# Patient Record
Sex: FEMALE | Race: BLACK OR AFRICAN AMERICAN | NOT HISPANIC OR LATINO | URBAN - METROPOLITAN AREA
[De-identification: names, ages, dates, MRNs, and addresses within clinical notes are randomized per-mention and may not be internally consistent; named-entity substitution may affect disease eponyms.]

---

## 2020-11-07 ENCOUNTER — EMERGENCY (EMERGENCY)
Facility: HOSPITAL | Age: 85
LOS: 1 days | Discharge: HOME | End: 2020-11-07
Attending: EMERGENCY MEDICINE | Admitting: EMERGENCY MEDICINE
Payer: MEDICARE

## 2020-11-07 VITALS
DIASTOLIC BLOOD PRESSURE: 68 MMHG | HEART RATE: 74 BPM | OXYGEN SATURATION: 100 % | RESPIRATION RATE: 18 BRPM | TEMPERATURE: 98 F | SYSTOLIC BLOOD PRESSURE: 157 MMHG

## 2020-11-07 VITALS
DIASTOLIC BLOOD PRESSURE: 65 MMHG | RESPIRATION RATE: 20 BRPM | TEMPERATURE: 98 F | SYSTOLIC BLOOD PRESSURE: 158 MMHG | HEART RATE: 100 BPM | OXYGEN SATURATION: 100 %

## 2020-11-07 LAB
ALBUMIN SERPL ELPH-MCNC: 4.2 G/DL — SIGNIFICANT CHANGE UP (ref 3.3–5)
ALP SERPL-CCNC: 72 U/L — SIGNIFICANT CHANGE UP (ref 40–120)
ALT FLD-CCNC: 14 U/L — SIGNIFICANT CHANGE UP (ref 4–33)
ANION GAP SERPL CALC-SCNC: 16 MMO/L — HIGH (ref 7–14)
AST SERPL-CCNC: 37 U/L — HIGH (ref 4–32)
BASOPHILS # BLD AUTO: 0.03 K/UL — SIGNIFICANT CHANGE UP (ref 0–0.2)
BASOPHILS NFR BLD AUTO: 0.5 % — SIGNIFICANT CHANGE UP (ref 0–2)
BILIRUB SERPL-MCNC: 0.4 MG/DL — SIGNIFICANT CHANGE UP (ref 0.2–1.2)
BUN SERPL-MCNC: 13 MG/DL — SIGNIFICANT CHANGE UP (ref 7–23)
CALCIUM SERPL-MCNC: 9.3 MG/DL — SIGNIFICANT CHANGE UP (ref 8.4–10.5)
CHLORIDE SERPL-SCNC: 103 MMOL/L — SIGNIFICANT CHANGE UP (ref 98–107)
CO2 SERPL-SCNC: 17 MMOL/L — LOW (ref 22–31)
CREAT SERPL-MCNC: 0.7 MG/DL — SIGNIFICANT CHANGE UP (ref 0.5–1.3)
EOSINOPHIL # BLD AUTO: 0.02 K/UL — SIGNIFICANT CHANGE UP (ref 0–0.5)
EOSINOPHIL NFR BLD AUTO: 0.3 % — SIGNIFICANT CHANGE UP (ref 0–6)
GLUCOSE SERPL-MCNC: 98 MG/DL — SIGNIFICANT CHANGE UP (ref 70–99)
HCT VFR BLD CALC: 42.4 % — SIGNIFICANT CHANGE UP (ref 34.5–45)
HGB BLD-MCNC: 13.1 G/DL — SIGNIFICANT CHANGE UP (ref 11.5–15.5)
IMM GRANULOCYTES NFR BLD AUTO: 0.3 % — SIGNIFICANT CHANGE UP (ref 0–1.5)
LYMPHOCYTES # BLD AUTO: 1.25 K/UL — SIGNIFICANT CHANGE UP (ref 1–3.3)
LYMPHOCYTES # BLD AUTO: 19.6 % — SIGNIFICANT CHANGE UP (ref 13–44)
MAGNESIUM SERPL-MCNC: 2.3 MG/DL — SIGNIFICANT CHANGE UP (ref 1.6–2.6)
MCHC RBC-ENTMCNC: 27.1 PG — SIGNIFICANT CHANGE UP (ref 27–34)
MCHC RBC-ENTMCNC: 30.9 % — LOW (ref 32–36)
MCV RBC AUTO: 87.8 FL — SIGNIFICANT CHANGE UP (ref 80–100)
MONOCYTES # BLD AUTO: 0.42 K/UL — SIGNIFICANT CHANGE UP (ref 0–0.9)
MONOCYTES NFR BLD AUTO: 6.6 % — SIGNIFICANT CHANGE UP (ref 2–14)
NEUTROPHILS # BLD AUTO: 4.65 K/UL — SIGNIFICANT CHANGE UP (ref 1.8–7.4)
NEUTROPHILS NFR BLD AUTO: 72.7 % — SIGNIFICANT CHANGE UP (ref 43–77)
NRBC # FLD: 0 K/UL — SIGNIFICANT CHANGE UP (ref 0–0)
PHOSPHATE SERPL-MCNC: 3.6 MG/DL — SIGNIFICANT CHANGE UP (ref 2.5–4.5)
PLATELET # BLD AUTO: 148 K/UL — LOW (ref 150–400)
PMV BLD: 12.3 FL — SIGNIFICANT CHANGE UP (ref 7–13)
POTASSIUM SERPL-MCNC: 5.1 MMOL/L — SIGNIFICANT CHANGE UP (ref 3.5–5.3)
POTASSIUM SERPL-SCNC: 5.1 MMOL/L — SIGNIFICANT CHANGE UP (ref 3.5–5.3)
PROT SERPL-MCNC: 7.9 G/DL — SIGNIFICANT CHANGE UP (ref 6–8.3)
RBC # BLD: 4.83 M/UL — SIGNIFICANT CHANGE UP (ref 3.8–5.2)
RBC # FLD: 13.6 % — SIGNIFICANT CHANGE UP (ref 10.3–14.5)
SODIUM SERPL-SCNC: 136 MMOL/L — SIGNIFICANT CHANGE UP (ref 135–145)
TROPONIN T, HIGH SENSITIVITY: 7 NG/L — SIGNIFICANT CHANGE UP (ref ?–14)
TROPONIN T, HIGH SENSITIVITY: 9 NG/L — SIGNIFICANT CHANGE UP (ref ?–14)
WBC # BLD: 6.39 K/UL — SIGNIFICANT CHANGE UP (ref 3.8–10.5)
WBC # FLD AUTO: 6.39 K/UL — SIGNIFICANT CHANGE UP (ref 3.8–10.5)

## 2020-11-07 PROCEDURE — 99284 EMERGENCY DEPT VISIT MOD MDM: CPT

## 2020-11-07 PROCEDURE — 71046 X-RAY EXAM CHEST 2 VIEWS: CPT | Mod: 26

## 2020-11-07 NOTE — ED ADULT TRIAGE NOTE - CHIEF COMPLAINT QUOTE
carito oliveros [phone].  c/o palpitations since yesterday with  ch pressure. reports pedal edema,placed on lasix wed with improvement. reports feels shortness breath x 2 days

## 2020-11-07 NOTE — ED PROVIDER NOTE - CLINICAL SUMMARY MEDICAL DECISION MAKING FREE TEXT BOX
Ms. Franco is a 85 with HTN who present for chest pain and SOB after self discontinuation of Lasix for 3 days. Will obtain BNP, CXR to assess for CHF. Will obtain basic labs (cbc, cmp, trop T)

## 2020-11-07 NOTE — ED PROVIDER NOTE - NS ED ROS FT
REVIEW OF SYSTEMS:  CONSTITUTIONAL: No fever, weight loss, or fatigue  EYES: No eye pain, visual disturbances, or discharge  RESPIRATORY: No cough, wheezing, chills or hemoptysis; No shortness of breath  CARDIOVASCULAR: + chest pain, + palpitations, dizziness, or leg swelling  GASTROINTESTINAL: No abdominal or epigastric pain. No nausea, vomiting, or hematemesis; No diarrhea or constipation. No melena or hematochezia.  GENITOURINARY: No dysuria, frequency, hematuria, or incontinence  NEUROLOGICAL: No headaches, memory loss, loss of strength, numbness, or tremors  MUSCULOSKELETAL: No joint pain or swelling; No muscle, back, or extremity pain

## 2020-11-07 NOTE — ED ADULT NURSE NOTE - NSSEPSISNEWALTERMENTAL_ED_A_ED
Telephone Encounter by Shasha Jasso at 10/23/18 12:52 PM     Author:  Shasha Jasso Service:  (none) Author Type:  Patient      Filed:  10/23/18 12:55 PM Encounter Date:  10/22/2018 Status:  Signed     :  Shasha Jasso (Patient )            Patient notified.[JP1.1T] [employer] message sent[JP1.1M]      Revision History        User Key Date/Time User Provider Type Action    > JP1.1 10/23/18 12:55 PM Shasha Jasso Patient  Sign    M - Manual, T - Template             No

## 2020-11-07 NOTE — ED PROVIDER NOTE - PATIENT PORTAL LINK FT
You can access the FollowMyHealth Patient Portal offered by [facility] by registering at the following website: [email or web address]. By joining [facility]’s FollowMyHealth portal, you will also be able to view your health information using other applications (apps) compatible with our system.

## 2020-11-07 NOTE — ED PROVIDER NOTE - NSFOLLOWUPINSTRUCTIONS_ED_ALL_ED_FT
Please be sure to take all your medications as prescribed. Please continue to take your hydralazine, amlodipine and furosemide as prescribed by your cardiologist.

## 2020-11-07 NOTE — ED ADULT NURSE NOTE - CHIEF COMPLAINT QUOTE
carito oliveros [phone].  c/o palpitations since yesterday with  ch pressure. reports pedal edema,placed on lasix wed with improvement. reports feels shortness breath x 2 days

## 2020-11-07 NOTE — ED ADULT NURSE NOTE - OBJECTIVE STATEMENT
Break coverage RN- Received pt in room 15. pt A&OX3, ambulatory. pt with history of HTN, on medication. Pt c/o intermittent chest pressure, palpitations, and sob since yesterday. pt also reports pedal edema placed on Lasix wed with improvement. pt appears stable and in no apparent distress at this time. vitals as noted. respirations are equal and nonlabored, no respiratory distress noted, sating 100% on room air. Pt placed on cardiac monitor. Pt denies any other medical complaints at this time. denies pain at this time, sob, cough, fever/chills, headache, dizziness, nausea, vomiting, diarrhea. 22 gauge iv placed in the left hand, labs sent. pt stable, awaiting further plan

## 2020-11-07 NOTE — ED PROVIDER NOTE - ATTENDING CONTRIBUTION TO CARE
Well appearing elderly female with resolved chest pain, currently asymptomatic. EKG WNL, lungs clear, no LE edema. Pending trops, cxr, pt already has f/u this week w her cardiologist.

## 2020-11-07 NOTE — ED PROVIDER NOTE - PHYSICAL EXAMINATION
PHYSICAL EXAM:  General: Alert and cooperative. No acute stress. Well developed, well nourished.   Eyes: Intact visual fields. PERRL, clear conjunctiva  Throat: Oral cavity and pharynx normal.   Respiratory: Mild crackles noted at the bases, no wheezing appreciated, no rhonchi.   Cardiovascular: S1/S2 auscultated. Regular rhythm. No murmurs, rubs or gallop. No peripheral edema, cyanosis, or pallor. Extremities warm and well perfused.   Abdomen: Soft, non-tender, nondistended, no guarding or rebound tenderness. Active bowel sounds in all 4 quadrants. No hepatosplenomegaly.  Musculoskeletal: Adequately aligned spine. ROM intact. No joint erythema or tenderness. Normal muscular development. Normal gait.   Extremities: No significant deformity or joint abnormality. Peripheral pulses intact. No varicosities. No peripheral edema, atrophy.

## 2020-11-07 NOTE — ED PROVIDER NOTE - OBJECTIVE STATEMENT
Ms. Franco is a 85 y.o F with HTN who presents for chest pain pressure for 2 days. She states that she visited her cardiologist Dr. Hays and was increased from hydral 50 qd to TID due to poorly controlled HTN. She was also recently prescribed lasix 20mg on Tuesday for LE edema. Ms. Franco is a 85 y.o F with HTN who presents for chest pain pressure for 2 days. She states that she visited her cardiologist Dr. Hays and was increased from hydral 50 qd to TID due to poorly controlled HTN. She was also recently prescribed Lasix 20mg on Tuesday for LE edema. Patient states that she only took one dose of Lasix on Wednesday and has not taken any since because her edema was better. She also endorse SOB and palpitations. She states that her BP has been elevated for the last few days. She denies abdominal pain, fevers, chills, headaches. Ms. Franco is a 85 y.o F with HTN who presents for chest pain pressure for 2 days. resolved today. She states that she visited her PCP and was increased from hydral 50 qd to TID due to poorly controlled HTN. She was also recently prescribed Lasix 20mg on Tuesday for LE edema. Patient states that she only took one dose of Lasix on Wednesday and has not taken any since because her edema was better. She also endorse SOB and palpitations. She states that her BP has been elevated for the last few days. She denies abdominal pain, fevers, chills, headaches.

## 2020-12-07 ENCOUNTER — INPATIENT (INPATIENT)
Facility: HOSPITAL | Age: 85
LOS: 0 days | Discharge: HOME | End: 2020-12-08
Attending: INTERNAL MEDICINE | Admitting: INTERNAL MEDICINE
Payer: MEDICARE

## 2020-12-07 VITALS
RESPIRATION RATE: 20 BRPM | TEMPERATURE: 99 F | DIASTOLIC BLOOD PRESSURE: 70 MMHG | SYSTOLIC BLOOD PRESSURE: 154 MMHG | HEART RATE: 106 BPM | OXYGEN SATURATION: 100 %

## 2020-12-07 DIAGNOSIS — R07.9 CHEST PAIN, UNSPECIFIED: ICD-10-CM

## 2020-12-07 PROBLEM — I10 ESSENTIAL (PRIMARY) HYPERTENSION: Chronic | Status: ACTIVE | Noted: 2020-11-07

## 2020-12-07 LAB
ADD ON TEST-SPECIMEN IN LAB: SIGNIFICANT CHANGE UP
ALBUMIN SERPL ELPH-MCNC: 4.9 G/DL — SIGNIFICANT CHANGE UP (ref 3.3–5)
ALP SERPL-CCNC: 91 U/L — SIGNIFICANT CHANGE UP (ref 40–120)
ALT FLD-CCNC: 10 U/L — SIGNIFICANT CHANGE UP (ref 4–33)
ANION GAP SERPL CALC-SCNC: 15 MMOL/L — HIGH (ref 7–14)
APTT BLD: 31 SEC — SIGNIFICANT CHANGE UP (ref 27–36.3)
AST SERPL-CCNC: 19 U/L — SIGNIFICANT CHANGE UP (ref 4–32)
BASOPHILS # BLD AUTO: 0.03 K/UL — SIGNIFICANT CHANGE UP (ref 0–0.2)
BASOPHILS NFR BLD AUTO: 0.4 % — SIGNIFICANT CHANGE UP (ref 0–2)
BILIRUB SERPL-MCNC: 0.5 MG/DL — SIGNIFICANT CHANGE UP (ref 0.2–1.2)
BUN SERPL-MCNC: 16 MG/DL — SIGNIFICANT CHANGE UP (ref 7–23)
CALCIUM SERPL-MCNC: 9.8 MG/DL — SIGNIFICANT CHANGE UP (ref 8.4–10.5)
CHLORIDE SERPL-SCNC: 105 MMOL/L — SIGNIFICANT CHANGE UP (ref 98–107)
CO2 SERPL-SCNC: 20 MMOL/L — LOW (ref 22–31)
CREAT SERPL-MCNC: 0.71 MG/DL — SIGNIFICANT CHANGE UP (ref 0.5–1.3)
D DIMER BLD IA.RAPID-MCNC: 2149 NG/ML DDU — HIGH
EOSINOPHIL # BLD AUTO: 0 K/UL — SIGNIFICANT CHANGE UP (ref 0–0.5)
EOSINOPHIL NFR BLD AUTO: 0 % — SIGNIFICANT CHANGE UP (ref 0–6)
GLUCOSE SERPL-MCNC: 109 MG/DL — HIGH (ref 70–99)
HCT VFR BLD CALC: 43.6 % — SIGNIFICANT CHANGE UP (ref 34.5–45)
HGB BLD-MCNC: 13.6 G/DL — SIGNIFICANT CHANGE UP (ref 11.5–15.5)
IANC: 5.11 K/UL — SIGNIFICANT CHANGE UP (ref 1.5–8.5)
IMM GRANULOCYTES NFR BLD AUTO: 0.3 % — SIGNIFICANT CHANGE UP (ref 0–1.5)
INR BLD: 1.09 RATIO — SIGNIFICANT CHANGE UP (ref 0.88–1.17)
LYMPHOCYTES # BLD AUTO: 1.34 K/UL — SIGNIFICANT CHANGE UP (ref 1–3.3)
LYMPHOCYTES # BLD AUTO: 19.4 % — SIGNIFICANT CHANGE UP (ref 13–44)
MCHC RBC-ENTMCNC: 26.6 PG — LOW (ref 27–34)
MCHC RBC-ENTMCNC: 31.2 GM/DL — LOW (ref 32–36)
MCV RBC AUTO: 85.3 FL — SIGNIFICANT CHANGE UP (ref 80–100)
MONOCYTES # BLD AUTO: 0.41 K/UL — SIGNIFICANT CHANGE UP (ref 0–0.9)
MONOCYTES NFR BLD AUTO: 5.9 % — SIGNIFICANT CHANGE UP (ref 2–14)
NEUTROPHILS # BLD AUTO: 5.11 K/UL — SIGNIFICANT CHANGE UP (ref 1.8–7.4)
NEUTROPHILS NFR BLD AUTO: 74 % — SIGNIFICANT CHANGE UP (ref 43–77)
NRBC # BLD: 0 /100 WBCS — SIGNIFICANT CHANGE UP
NRBC # FLD: 0 K/UL — SIGNIFICANT CHANGE UP
NT-PROBNP SERPL-SCNC: 125 PG/ML — SIGNIFICANT CHANGE UP
PLATELET # BLD AUTO: 217 K/UL — SIGNIFICANT CHANGE UP (ref 150–400)
POTASSIUM SERPL-MCNC: 3.4 MMOL/L — LOW (ref 3.5–5.3)
POTASSIUM SERPL-SCNC: 3.4 MMOL/L — LOW (ref 3.5–5.3)
PROT SERPL-MCNC: 8.6 G/DL — HIGH (ref 6–8.3)
PROTHROM AB SERPL-ACNC: 12.5 SEC — SIGNIFICANT CHANGE UP (ref 9.8–13.1)
RBC # BLD: 5.11 M/UL — SIGNIFICANT CHANGE UP (ref 3.8–5.2)
RBC # FLD: 13.4 % — SIGNIFICANT CHANGE UP (ref 10.3–14.5)
SARS-COV-2 RNA SPEC QL NAA+PROBE: SIGNIFICANT CHANGE UP
SODIUM SERPL-SCNC: 140 MMOL/L — SIGNIFICANT CHANGE UP (ref 135–145)
TROPONIN T, HIGH SENSITIVITY RESULT: 10 NG/L — SIGNIFICANT CHANGE UP
TROPONIN T, HIGH SENSITIVITY RESULT: 10 NG/L — SIGNIFICANT CHANGE UP
WBC # BLD: 6.91 K/UL — SIGNIFICANT CHANGE UP (ref 3.8–10.5)
WBC # FLD AUTO: 6.91 K/UL — SIGNIFICANT CHANGE UP (ref 3.8–10.5)

## 2020-12-07 PROCEDURE — 71275 CT ANGIOGRAPHY CHEST: CPT | Mod: 26

## 2020-12-07 PROCEDURE — 99223 1ST HOSP IP/OBS HIGH 75: CPT

## 2020-12-07 PROCEDURE — 71045 X-RAY EXAM CHEST 1 VIEW: CPT | Mod: 26

## 2020-12-07 PROCEDURE — 99285 EMERGENCY DEPT VISIT HI MDM: CPT

## 2020-12-07 NOTE — ED PROVIDER NOTE - ATTENDING CONTRIBUTION TO CARE
Dr. Pineda:  I performed a face to face bedside interview with patient regarding history of present illness, review of symptoms and past medical history. I completed an independent physical exam.  I have discussed patient's plan of care with PA.   I agree with note as stated above, having amended the EMR as needed to reflect my findings.   This includes HISTORY OF PRESENT ILLNESS, HIV, PAST MEDICAL/SURGICAL/FAMILY/SOCIAL HISTORY, ALLERGIES AND HOME MEDICATIONS, REVIEW OF SYSTEMS, PHYSICAL EXAM, and any PROGRESS NOTES during the time I functioned as the attending physician for this patient.    85F h/o HTN, HLD, presents with 1 week of SOB and right sided chest pain and associated generalized weakness.  Symptoms exacerbated by exertion, improves with rest.  Had echo 1mo ago and reportedly WNL.  ROS otherwise negative.      Exam:  - nad  - mildly tachy  - ctab   -abd soft ntnd  - no pedal edema    A/P  - SOB, CP, eval ACS, PNA  - cbc, cmp, trop, bnp, ekg, CXR

## 2020-12-07 NOTE — ED ADULT NURSE NOTE - OBJECTIVE STATEMENT
pt received in rm 15 AAO x 3. pt reports intermittent right sided chest pain associated with SOB on exertion x 2 weeks. pt denies n/v/d, fevers, chills, cough, recent travel, sick contacts. respirations even and unlabored. no edema or redness noted to b/l LE. 20g iv placed to left ac. sinus rhythm on monitor.

## 2020-12-07 NOTE — ED PROVIDER NOTE - OBJECTIVE STATEMENT
86 y/o F hx HTN here c/o right sided chest pain x 2 weeks, intermittent, constant x 3 days. Associated w. shortness of breath on exertion and with laying down flat. Went to see her cardiologist several weeks ago, who performed an echo which was wnl. Pt states she also had swelling in her legs, had an US done, presumably no clots, but was started on Simvastatin. Last treadmill stress test was done earlier in the year, no nuclear stress was performed. Denies fevers, chills, cough, smoking hx, calf swelling/tenderness, recent travel or any other complaints.

## 2020-12-07 NOTE — ED PROVIDER NOTE - CLINICAL SUMMARY MEDICAL DECISION MAKING FREE TEXT BOX
86 y/o F hx HTN here c/o right sided chest pain x 2 weeks, intermittent, constant x 3 days. Heart score 4. Plan labs/trop, cxr, admit for acs work up

## 2020-12-07 NOTE — ED ADULT NURSE NOTE - CHIEF COMPLAINT QUOTE
Pt c/o intermittent non-radiating, right sided chest pain since Saturday. Reports today the chest pain is consistent and is now experiencing consistent shortness of breath. Additionally reports noticing bruising on her left thigh. Denies recent falls. Takes aspirin. Hx of HTN

## 2020-12-07 NOTE — ED ADULT TRIAGE NOTE - CHIEF COMPLAINT QUOTE
Pt c/o intermittent non-radiating, right sided chest pain since Saturday. Reports today the chest pain was consistent and is now experiencing consistent shortness of breath. Additionally reports noticing bruising on her left thigh. Denies recent falls. Takes aspirin. Hx of HTN Pt c/o intermittent non-radiating, right sided chest pain since Saturday. Reports today the chest pain is consistent and is now experiencing consistent shortness of breath. Additionally reports noticing bruising on her left thigh. Denies recent falls. Takes aspirin. Hx of HTN

## 2020-12-08 VITALS
SYSTOLIC BLOOD PRESSURE: 120 MMHG | OXYGEN SATURATION: 99 % | DIASTOLIC BLOOD PRESSURE: 72 MMHG | HEART RATE: 72 BPM | RESPIRATION RATE: 18 BRPM

## 2020-12-08 DIAGNOSIS — Z29.9 ENCOUNTER FOR PROPHYLACTIC MEASURES, UNSPECIFIED: ICD-10-CM

## 2020-12-08 DIAGNOSIS — R60.0 LOCALIZED EDEMA: ICD-10-CM

## 2020-12-08 DIAGNOSIS — I10 ESSENTIAL (PRIMARY) HYPERTENSION: ICD-10-CM

## 2020-12-08 DIAGNOSIS — R07.9 CHEST PAIN, UNSPECIFIED: ICD-10-CM

## 2020-12-08 DIAGNOSIS — Z90.49 ACQUIRED ABSENCE OF OTHER SPECIFIED PARTS OF DIGESTIVE TRACT: Chronic | ICD-10-CM

## 2020-12-08 LAB
ANION GAP SERPL CALC-SCNC: 13 MMOL/L — SIGNIFICANT CHANGE UP (ref 7–14)
BUN SERPL-MCNC: 13 MG/DL — SIGNIFICANT CHANGE UP (ref 7–23)
CALCIUM SERPL-MCNC: 9.9 MG/DL — SIGNIFICANT CHANGE UP (ref 8.4–10.5)
CHLORIDE SERPL-SCNC: 106 MMOL/L — SIGNIFICANT CHANGE UP (ref 98–107)
CO2 SERPL-SCNC: 22 MMOL/L — SIGNIFICANT CHANGE UP (ref 22–31)
CREAT SERPL-MCNC: 0.63 MG/DL — SIGNIFICANT CHANGE UP (ref 0.5–1.3)
GLUCOSE SERPL-MCNC: 121 MG/DL — HIGH (ref 70–99)
POTASSIUM SERPL-MCNC: 3.9 MMOL/L — SIGNIFICANT CHANGE UP (ref 3.5–5.3)
POTASSIUM SERPL-SCNC: 3.9 MMOL/L — SIGNIFICANT CHANGE UP (ref 3.5–5.3)
SODIUM SERPL-SCNC: 141 MMOL/L — SIGNIFICANT CHANGE UP (ref 135–145)

## 2020-12-08 PROCEDURE — 93018 CV STRESS TEST I&R ONLY: CPT | Mod: GC

## 2020-12-08 PROCEDURE — 99236 HOSP IP/OBS SAME DATE HI 85: CPT

## 2020-12-08 PROCEDURE — 93016 CV STRESS TEST SUPVJ ONLY: CPT | Mod: GC

## 2020-12-08 PROCEDURE — 78452 HT MUSCLE IMAGE SPECT MULT: CPT | Mod: 26

## 2020-12-08 RX ORDER — ENOXAPARIN SODIUM 100 MG/ML
40 INJECTION SUBCUTANEOUS DAILY
Refills: 0 | Status: DISCONTINUED | OUTPATIENT
Start: 2020-12-08 | End: 2020-12-08

## 2020-12-08 RX ORDER — HYDRALAZINE HCL 50 MG
1 TABLET ORAL
Qty: 0 | Refills: 0 | DISCHARGE

## 2020-12-08 RX ORDER — ASPIRIN/CALCIUM CARB/MAGNESIUM 324 MG
1 TABLET ORAL
Qty: 0 | Refills: 0 | DISCHARGE
Start: 2020-12-08

## 2020-12-08 RX ORDER — FUROSEMIDE 40 MG
1 TABLET ORAL
Qty: 0 | Refills: 0 | DISCHARGE

## 2020-12-08 RX ORDER — AMLODIPINE BESYLATE 2.5 MG/1
10 TABLET ORAL DAILY
Refills: 0 | Status: DISCONTINUED | OUTPATIENT
Start: 2020-12-08 | End: 2020-12-08

## 2020-12-08 RX ORDER — AMLODIPINE BESYLATE 2.5 MG/1
1 TABLET ORAL
Qty: 0 | Refills: 0 | DISCHARGE

## 2020-12-08 RX ORDER — ATORVASTATIN CALCIUM 80 MG/1
10 TABLET, FILM COATED ORAL AT BEDTIME
Refills: 0 | Status: DISCONTINUED | OUTPATIENT
Start: 2020-12-08 | End: 2020-12-08

## 2020-12-08 RX ORDER — POTASSIUM CHLORIDE 20 MEQ
40 PACKET (EA) ORAL ONCE
Refills: 0 | Status: COMPLETED | OUTPATIENT
Start: 2020-12-08 | End: 2020-12-08

## 2020-12-08 RX ORDER — HYDRALAZINE HCL 50 MG
50 TABLET ORAL
Refills: 0 | Status: DISCONTINUED | OUTPATIENT
Start: 2020-12-08 | End: 2020-12-08

## 2020-12-08 RX ORDER — ACETAMINOPHEN 500 MG
650 TABLET ORAL EVERY 6 HOURS
Refills: 0 | Status: DISCONTINUED | OUTPATIENT
Start: 2020-12-08 | End: 2020-12-08

## 2020-12-08 RX ORDER — ASPIRIN/CALCIUM CARB/MAGNESIUM 324 MG
81 TABLET ORAL DAILY
Refills: 0 | Status: DISCONTINUED | OUTPATIENT
Start: 2020-12-08 | End: 2020-12-08

## 2020-12-08 RX ORDER — FUROSEMIDE 40 MG
20 TABLET ORAL DAILY
Refills: 0 | Status: DISCONTINUED | OUTPATIENT
Start: 2020-12-08 | End: 2020-12-08

## 2020-12-08 RX ADMIN — ENOXAPARIN SODIUM 40 MILLIGRAM(S): 100 INJECTION SUBCUTANEOUS at 11:58

## 2020-12-08 RX ADMIN — Medication 50 MILLIGRAM(S): at 00:23

## 2020-12-08 RX ADMIN — Medication 50 MILLIGRAM(S): at 18:37

## 2020-12-08 RX ADMIN — Medication 81 MILLIGRAM(S): at 11:58

## 2020-12-08 RX ADMIN — AMLODIPINE BESYLATE 10 MILLIGRAM(S): 2.5 TABLET ORAL at 05:16

## 2020-12-08 RX ADMIN — Medication 50 MILLIGRAM(S): at 05:16

## 2020-12-08 RX ADMIN — Medication 40 MILLIEQUIVALENT(S): at 00:39

## 2020-12-08 NOTE — H&P ADULT - HISTORY OF PRESENT ILLNESS
84 y/o F hx HTN, LE edema on lasix p/w c/o right sided chest pain x 1 week. Pt reports it was initially sharp but now pressure like pain, intermittent without radiation to the arm/jaw. States she had no pain on arrival but now feels some pressure like pain behind the right breast/under the axilla that radiates towards the RUQ. Pain not associated with exertion, alleviated by Tylenol. No prior history of similar pain. Admits to lifting heavy groceries about a week ago. No associated diaphoresis, N/V however pt has noticed associated shortness of breath on exertion that preceded chest pain, states onset of SOB and fatigue after walking 2 blocks.  Went to see her cardiologist several weeks ago, who performed an echo which was wnl. She was started on lasix for LE swelling which was since resolved. Pt also had dopplers of her legs performed, presumably no clots, but was started on Simvastatin for unclear reason. Additional BP agents were recenrlt added on due to uncontrolled blood pressure. Pt reports last treadmill stress test was done earlier in the year and was normal. Denies fevers, chills, cough, calf swelling/tenderness, recent travel. 86 y/o F hx HTN, LE edema on lasix p/w c/o right sided chest pain x 1 week. Pt reports it was initially sharp but now pressure like pain, intermittent without radiation to the arm/jaw. States she had no pain on arrival but now feels some pressure like pain behind the right breast/under the axilla that radiates towards the RUQ. Pain not associated with exertion, alleviated by Tylenol. No prior history of similar pain. Admits to lifting heavy groceries about a week ago. No associated diaphoresis, N/V however pt has noticed associated shortness of breath on exertion that preceded chest pain, states onset of SOB and fatigue after walking 2 blocks.  Went to see her cardiologist several weeks ago, who performed an echo which was wnl. She was started on lasix for LE swelling which was since resolved. Pt also had dopplers of her legs performed, presumably no clots, but was started on Simvastatin for unclear reason. Additional BP agents were recently added on due to uncontrolled blood pressure. Pt reports last treadmill stress test was done earlier in the year and was normal. Denies fevers, chills, cough, calf swelling/tenderness, recent travel.

## 2020-12-08 NOTE — DISCHARGE NOTE PROVIDER - NSDCCPCAREPLAN_GEN_ALL_CORE_FT
PRINCIPAL DISCHARGE DIAGNOSIS  Diagnosis: Chest pain  Assessment and Plan of Treatment: Follow up with your cardiologist in one week  low salt, low fat, low cholesterol  Continue aspirin      SECONDARY DISCHARGE DIAGNOSES  Diagnosis: HTN (hypertension)  Assessment and Plan of Treatment: HTN (hypertension)  Continue lasix, hydralazine, norvasc

## 2020-12-08 NOTE — H&P ADULT - NSHPLABSRESULTS_GEN_ALL_CORE
(12-07 @ 16:12)                      13.6  6.91 )-----------( 217                 43.6    Neutrophils = 5.11 (74.0%)  Lymphocytes = 1.34 (19.4%)  Eosinophils = 0.00 (0.0%)  Basophils = 0.03 (0.4%)  Monocytes = 0.41 (5.9%)  Bands = --%    12-07    140  |  105  |  16  ----------------------------<  109<H>  3.4<L>   |  20<L>  |  0.71    Ca    9.8      07 Dec 2020 16:16    TPro  8.6<H>  /  Alb  4.9  /  TBili  0.5  /  DBili  x   /  AST  19  /  ALT  10  /  AlkPhos  91  12-07    ( 07 Dec 2020 16:14 )   PT: 12.5 sec;   INR: 1.09 ratio;       PTT:31.0 sec    < from: CT Angio Chest w/ IV Cont (12.07.20 @ 18:43) >      IMPRESSION:    No pulmonary embolus.      < end of copied text >      Labs and imaging reviewed  EKG: NSR, some artifact in leads v4-v6 however no acute ischemic changes

## 2020-12-08 NOTE — H&P ADULT - PROBLEM SELECTOR PLAN 3
-none at present  -recent dopplers neg for DVT  -reportedly started on lasix? Will hold until can clarify especially in the setting of recent contrast load so as to prevent any kidney disfunction -none at present  -recent dopplers neg for DVT  -reportedly started on lasix? Will hold until can clarify/obtain TTE results especially in the setting of recent contrast load so as to prevent any kidney disfunction

## 2020-12-08 NOTE — H&P ADULT - PROBLEM SELECTOR PLAN 2
-uncontrolled however without signs of end organ damage   -c/w hydralazine, amlodipine  -titrate as needed

## 2020-12-08 NOTE — CONSULT NOTE ADULT - SUBJECTIVE AND OBJECTIVE BOX
Cardiology/Vascular Medicine Inpatient Consultation Note    HISTORY OF PRESENT ILLNESS:  Patient is an 86 y/o F hx HTN, LE edema on lasix p/w c/o right sided chest pain x 1 week. Pt reports it was initially sharp but now pressure like pain, intermittent without radiation to the arm/jaw. States she had no pain on arrival but now feels some pressure like pain behind the right breast/under the axilla that radiates towards the RUQ. Pain not associated with exertion, alleviated by Tylenol. No prior history of similar pain. Admits to lifting heavy groceries about a week ago. No associated diaphoresis, N/V however pt has noticed associated shortness of breath on exertion that preceded chest pain, states onset of SOB and fatigue after walking 2 blocks.  Went to see her cardiologist several weeks ago, who performed an echo which was wnl. She was started on lasix for LE swelling which was since resolved. Pt also had dopplers of her legs performed, presumably no clots, but was started on Simvastatin for unclear reason. Additional BP agents were recently added on due to uncontrolled blood pressure. Pt reports last treadmill stress test was done earlier in the year and was normal. Denies fevers, chills, cough, calf swelling/tenderness, recent travel. (08 Dec 2020 00:12)    Allergies  No Known Allergies    MEDICATIONS:  amLODIPine   Tablet 10 milliGRAM(s) Oral daily  aspirin enteric coated 81 milliGRAM(s) Oral daily  enoxaparin Injectable 40 milliGRAM(s) SubCutaneous daily  hydrALAZINE 50 milliGRAM(s) Oral two times a day  acetaminophen   Tablet .. 650 milliGRAM(s) Oral every 6 hours PRN  atorvastatin 10 milliGRAM(s) Oral at bedtime    PAST MEDICAL & SURGICAL HISTORY:  HTN (hypertension)  Status post cholecystectomy    FAMILY HISTORY:  FH: heart disease    SOCIAL HISTORY:    As above, as per chart notes    REVIEW OF SYSTEMS:  As above, as per chart notes    PHYSICAL EXAM:  T(C): 36.8 (12-08-20 @ 05:15), Max: 37.2 (12-07-20 @ 12:18)  HR: 80 (12-08-20 @ 05:15) (73 - 106)  BP: 133/73 (12-08-20 @ 05:15) (133/73 - 182/73)  RR: 18 (12-08-20 @ 05:15) (15 - 20)  SpO2: 100% (12-08-20 @ 05:15) (100% - 100%)    Appearance: NAD  HEENT:   No JVD  Cardiovascular: Normal S1 S2, No JVD, No murmurs, No edema  Respiratory: Decreased breath sounds bilateral bases  Psychiatry: Awake, alert  Gastrointestinal:  Soft, Non-tender, + BS	  Extremities: No edema    LABS:	 	                          13.6   6.91  )-----------( 217      ( 07 Dec 2020 16:12 )             43.6     12-07    140  |  105  |  16  ----------------------------<  109<H>  3.4<L>   |  20<L>  |  0.71    Ca    9.8      07 Dec 2020 16:16    TPro  8.6<H>  /  Alb  4.9  /  TBili  0.5  /  DBili  x   /  AST  19  /  ALT  10  /  AlkPhos  91  12-07      proBNP: Serum Pro-Brain Natriuretic Peptide: 125 pg/mL (12-07-20 @ 16:16)      d< from: CT Angio Chest w/ IV Cont (12.07.20 @ 18:43) >    EXAM:  CT ANGIO CHEST (W)AW IC        PROCEDURE DATE:  Dec  7 2020         INTERPRETATION:  INDICATION, CLINICAL INFORMATION: Shortness of breath    TECHNIQUE: CT pulmonary angiogram of the chest was performed. Coronal and sagittal images were reconstructed. Maximum intensity projection images were generated. Images were obtained after the uneventful administration of 90 cc nonionic intravenous Omnipaque 350. 10 cc of Omnipaque 350 was discarded.      COMPARISON: None.    FINDINGS:    PULMONARYVESSELS: No pulmonary embolus. Main pulmonary artery normal in diameter.    HEART AND VASCULATURE: Cardiomegaly. No pericardial effusion. No aortic aneurysm or dissection. Coronary calcifications.    LUNGS, AIRWAYS, PLEURA: Patent central airways. Clear lungs. No pleural effusions or pneumothorax.    MEDIASTINUM: No mass or lymphadenopathy.    UPPER ABDOMEN: Within normal limits.    BONES AND SOFT TISSUES: No aggressive osseous lesion.    LOWER NECK: Small thyroid nodules.    IMPRESSION:    No pulmonary embolus.      VALENTINA SHEFFIELD MD; Attending Radiologist  This document has been electronically signed. Dec  7 2020  6:55PM    < end of copied text >  < from: Xray Chest 1 View- PORTABLE-Urgent (Xray Chest 1 View- PORTABLE-Urgent .) (12.07.20 @ 14:58) >    EXAM:  XR CHEST PORTABLE URGENT 1V        PROCEDURE DATE:  Dec  7 2020         INTERPRETATION:  CLINICAL INDICATION: chest pain; dyspnea    EXAM:  Portable upright frontal chest from 12/7/2020 at 1458. Compared to prior study from 11/7/2020.    IMPRESSION:  Clear lungs. No pleural effusions or pneumothorax.    Stable cardiac and mediastinal silhouettes including aortic arch calcifications.    Trachea midline.    Generalized osteopenia and spinal degenerative changes with slight curvature again noted. Left shoulder degenerative change.    Also correlate with findings on already pending chest CTA.      JACKIE VAUGHAN MD; Attending Radiologist  This document has been electronically signed. Dec  7 2020  4:00PM    < end of copied text >   Cardiology/Vascular Medicine Inpatient Consultation Note    HISTORY OF PRESENT ILLNESS:  Patient is an 86 y/o F hx HTN, LE edema on Lasix p/w c/o right sided chest pain x 1 week.     Pt reports it was initially sharp but now pressure like pain, intermittent without radiation to the arm/jaw. States she had no pain on arrival but now feels some pressure like pain behind the right breast/under the axilla that radiates towards the RUQ. Pain not associated with exertion, alleviated by Tylenol. No prior history of similar pain. Admits to lifting heavy groceries about a week ago. No associated diaphoresis, N/V however pt has noticed associated shortness of breath on exertion that preceded chest pain, states onset of SOB and fatigue after walking 2 blocks.  Went to see her cardiologist several weeks ago, who performed an echocardiogram which was reportedly "normal" as per patient.     She was started on Lasix for LE swelling which was since resolved. LE venous duplex US was performed also, presumably no clots, but was started on Simvastatin for unclear reason.  Additional BP agents were recently added on due to uncontrolled blood pressure. Pt reports last treadmill stress test was done earlier in the year and was normal. Denies fevers, chills, cough, calf swelling/tenderness, recent travel. (08 Dec 2020 00:12)    At the time of my evaluation, the patient appeared clinically and hemodynamically stable. No interval events.  CTA chest unremarkable for PE.  NST unremarkable for ischemia.    Patient may be discharged home today with advice to f/u PMD.    Allergies  No Known Allergies    MEDICATIONS:  amLODIPine   Tablet 10 milliGRAM(s) Oral daily  aspirin enteric coated 81 milliGRAM(s) Oral daily  enoxaparin Injectable 40 milliGRAM(s) SubCutaneous daily  hydrALAZINE 50 milliGRAM(s) Oral two times a day  acetaminophen   Tablet .. 650 milliGRAM(s) Oral every 6 hours PRN  atorvastatin 10 milliGRAM(s) Oral at bedtime    PAST MEDICAL & SURGICAL HISTORY:  HTN (hypertension)  Status post cholecystectomy    FAMILY HISTORY:  FH: heart disease    SOCIAL HISTORY:    As above, as per chart notes    REVIEW OF SYSTEMS:  As above, as per chart notes    PHYSICAL EXAM:  T(C): 36.8 (20 @ 05:15), Max: 37.2 (20 @ 12:18)  HR: 80 (20 @ 05:15) (73 - 106)  BP: 133/73 (20 @ 05:15) (133/73 - 182/73)  RR: 18 (20 @ 05:15) (15 - 20)  SpO2: 100% (20 @ 05:15) (100% - 100%)    Appearance: NAD  HEENT:   No JVD  Cardiovascular: Normal S1 S2, No JVD, No murmurs, No edema  Respiratory: Decreased breath sounds bilateral bases  Psychiatry: Awake, alert  Gastrointestinal:  Soft, Non-tender, + BS	  Extremities: No edema    LABS:	 	                          13.6   6.91  )-----------( 217      ( 07 Dec 2020 16:12 )             43.6         140  |  105  |  16  ----------------------------<  109<H>  3.4<L>   |  20<L>  |  0.71    Ca    9.8      07 Dec 2020 16:16    TPro  8.6<H>  /  Alb  4.9  /  TBili  0.5  /  DBili  x   /  AST  19  /  ALT  10  /  AlkPhos  91  12-      proBNP: Serum Pro-Brain Natriuretic Peptide: 125 pg/mL (20 @ 16:16)      d< from: CT Angio Chest w/ IV Cont (20 @ 18:43) >    EXAM:  CT ANGIO CHEST (W)AW IC        PROCEDURE DATE:  Dec  7 2020         INTERPRETATION:  INDICATION, CLINICAL INFORMATION: Shortness of breath    TECHNIQUE: CT pulmonary angiogram of the chest was performed. Coronal and sagittal images were reconstructed. Maximum intensity projection images were generated. Images were obtained after the uneventful administration of 90 cc nonionic intravenous Omnipaque 350. 10 cc of Omnipaque 350 was discarded.      COMPARISON: None.    FINDINGS:    PULMONARYVESSELS: No pulmonary embolus. Main pulmonary artery normal in diameter.    HEART AND VASCULATURE: Cardiomegaly. No pericardial effusion. No aortic aneurysm or dissection. Coronary calcifications.    LUNGS, AIRWAYS, PLEURA: Patent central airways. Clear lungs. No pleural effusions or pneumothorax.    MEDIASTINUM: No mass or lymphadenopathy.    UPPER ABDOMEN: Within normal limits.    BONES AND SOFT TISSUES: No aggressive osseous lesion.    LOWER NECK: Small thyroid nodules.    IMPRESSION:    No pulmonary embolus.      VALENTINA SHEFFIELD MD; Attending Radiologist  This document has been electronically signed. Dec  7 2020  6:55PM    < end of copied text >  < from: Xray Chest 1 View- PORTABLE-Urgent (Xray Chest 1 View- PORTABLE-Urgent .) (20 @ 14:58) >    EXAM:  XR CHEST PORTABLE URGENT 1V        PROCEDURE DATE:  Dec  7 2020         INTERPRETATION:  CLINICAL INDICATION: chest pain; dyspnea    EXAM:  Portable upright frontal chest from 2020 at 1458. Compared to prior study from 2020.    IMPRESSION:  Clear lungs. No pleural effusions or pneumothorax.    Stable cardiac and mediastinal silhouettes including aortic arch calcifications.    Trachea midline.    Generalized osteopenia and spinal degenerative changes with slight curvature again noted. Left shoulder degenerative change.    Also correlate with findings on already pending chest CTA.      JACKIE VAUGHAN MD; Attending Radiologist  This document has been electronically signed. Dec  7 2020  4:00PM    < end of copied text >    nu< from: Nuclear Stress Test-Pharmacologic (Nuclear Stress Test-Pharmacologic .) (20 @ 09:23) >    PATIENT: NIKUNJ HERNANDEZ  : 1935   AGE: 85 (F)   MR#: [id number]  STUDY DATE: 2020  LOCATION: [facility]  REF. PHYSICIAN(S): Viral Saucedo MD  FELLOW: Kala Jaimes M.D.  ------------------------------------------------------------------------  TYPE OF TEST: Stress Pharmacologic  INDICATION: Chest pain, unspecified (R07.9)  ------------------------------------------------------------------------  HISTORY:  CARDIAC HISTORY: 85 year old woman with past medical  history of hypertension arrived to hospital with chest  pain, shortness of breath, and LE edema  RISK FACTORS: Hypertension, Post Menopausal  MEDICATIONS: ASA, Lovenox, Tylenol, Amlodipine, Lipitor,  Lasix, Hydralazine  ------------------------------------------------------------------------  BASELINE ELECTROCARDIOGRAM:  Rhythm: Normal Sinus Rhythm - 95 BPM - 1st Degree Heart  Block  ST: Nonspecific ST-T wave abnormality.  ------------------------------------------------------------------------  HEMODYNAMIC PARAMETERS:                                      HR      BP  Baseline  Pre-Injection             95  147/68  00:00     Inject Regadenoson        93  00:30     Post Injection            92  150/76  01:00     Post Injection           111  152/65  02:00     PostInjection           113  153/66  03:00     Post Injection           105  146/63  04:00     Post Injection           102  144/59  05:00     Recovery                  93  134/57  06:00     Recovery                  92  07:00     Recovery  98  129/58  09:06     Recovery                  88  129/54  ------------------------------------------------------------------------  Agent: Regadenoson 0.4 mg/5 ml NS. injected over 10 sec.  HR: Baseline HR: 95 bpm   Peak HR: 113 bpm (84% of MPHR)  MPHR: 135 bpm   85% of MPHR: 115 bpm  BP: Baseline BP: 147/68 mmHg   Peak BP: 153/66 mmHg   Peak  RPP: [id number] (Rate Pressure Product)  Last Caffeine intake: 12 hrs  Terminated: Completion of protocol  ------------------------------------------------------------------------  SYMPTOMS/FINDINGS:  Symptoms: Shortness of breath  Chest pain: No chest pain with administration of  Regadenoson  Treatment: None  ------------------------------------------------------------------------  ECG ABNORMALITIES DURING/AFTER STRESS:   Abnormalities: None  ------------------------------------------------------------------------  NEW ARRHYTHMIAS DEVELOPED DURING/AFTER STRESS:  Rare VPDs occurred during recovery.  ------------------------------------------------------------------------  STRESS TEST IMPRESSIONS:  Chest Pain: No chest pain with administration of  Regadenoson.  Symptom: Shortness of breath.  HR Response: Appropriate.  BP Response: Appropriate.  Heart Rhythm: Normal Sinus Rhythm - 95 BPM - 1st Degree  Heart Block.  Baseline ECG: Nonspecific ST-T wave abnormality.  ECG Abnormalities: None.  Arrhythmia: Rare VPDs occurred during recovery.  ------------------------------------------------------------------------  PROCEDURE:  7.81 mCi of Tc99m Sestamibi were injected during stress  protocol. Approximately 45 minutes later, tomographic  images were obtained in a 180 degree arc from right  anterior oblique to left anterior oblique with 64 stops.  The tomographic slices were reconstructed in 3 orthogonal  planes (short axis, horizontal long axis and vertical long  axis).  Interpretation was performed both by visual and  quantitative analysis.  Stress images were acquired using CZT-based system with  pinhole collimation (Guruji  c, Sviral),  and reconstructed using MLEM algorithm. Images were  re-acquired with the patient in a prone position.  ------------------------------------------------------------------------  NUCLEAR FINDINGS:  Review of raw data shows: The study is of good technical  quality.  The left ventricle was normal in size. Normal myocardial  perfusion scan,with no evidence of infarction or inducible  ischemia.  ------------------------------------------------------------------------  GATED ANALYSIS:  Post-stress gated wall motion analysis was performed (LVEF  = 67 %;LVEDV = 64 ml.), revealing normal LV function. RV  function appeared normal.  ------------------------------------------------------------------------  IMPRESSIONS:Normal Study  * Myocardial Perfusion SPECT results are normal.  * Review of raw data shows: The study is of good technical  quality.  * The left ventricle was normal in size. Normal myocardial  perfusion scan,with no evidence of infarction or inducible  ischemia.  * Post-stress gated wall motion analysis was performed  (LVEF = 67 %;LVEDV = 64 ml.), revealing normal LV  function. RV function appeared normal.  ------------------------------------------------------------------------  Confirmed on  2020 - 11:37:05 by Gene Medrano M.D.  ------------------------------------------------------------------------    < end of copied text >

## 2020-12-08 NOTE — H&P ADULT - NSHPREVIEWOFSYSTEMS_GEN_ALL_CORE
CONSTITUTIONAL:  No weight loss, fever, chills, weakness or fatigue.  HEENT:  Eyes:  No visual loss, blurred vision, double vision or yellow sclerae. Ears, Nose, Throat:  No hearing loss, sneezing, congestion, runny nose or sore throat.  SKIN:  No rash or itching.  CARDIOVASCULAR:  +right sided chest pain/pressure No palpitations or edema.  RESPIRATORY:  +LEWIS No cough or sputum.  GASTROINTESTINAL:  No anorexia, nausea, vomiting or diarrhea. No abdominal pain or blood.  GENITOURINARY:  Denies hematuria, dysuria.   NEUROLOGICAL:  No headache, dizziness, syncope, paralysis, ataxia, numbness or tingling in the extremities. No change in bowel or bladder control.  MUSCULOSKELETAL:  No muscle, back pain, joint pain or stiffness.  HEMATOLOGIC:  No anemia, bleeding or bruising.  LYMPHATICS:  No enlarged nodes. No history of splenectomy.  PSYCHIATRIC:  No history of depression or anxiety.  ENDOCRINOLOGIC:  No reports of sweating, cold or heat intolerance. No polyuria or polydipsia.  ALLERGIES:  No history of asthma, hives, eczema or rhinitis.

## 2020-12-08 NOTE — H&P ADULT - NSHPPHYSICALEXAM_GEN_ALL_CORE
GENERAL APPEARANCE: Well developed, well nourished, alert and cooperative. NAD.   HEENT:  PERRL, EOMI. External auditory canals normal, hearing grossly intact.  NECK: Neck supple, non-tender without lymphadenopathy, masses or thyromegaly.  CARDIAC: Normal S1 and S2. No S3, S4 or murmurs. Rhythm is regular.  LUNGS: Clear to auscultation and percussion without rales, rhonchi, wheezing or diminished breath sounds.  ABDOMEN: Positive bowel sounds. Soft, nondistended, nontender. No guarding or rebound.   MUSCULOSKELETAL: ROM intact spine and extremities. No joint erythema or tenderness.   BACK: normal posture, no spinal deformity, symmetry of spinal muscles, without tenderness, decreased range of motion.  EXTREMITIES: No significant deformity or joint abnormality. No edema. Peripheral pulses intact.  NEUROLOGICAL: CN II-XII intact. Strength and sensation symmetric and intact throughout.   SKIN: Varicose veins   PSYCHIATRIC: AOx3.Normal affect and behavior.

## 2020-12-08 NOTE — CONSULT NOTE ADULT - ASSESSMENT
84 y/o F hx HTN, LE edema on lasix p/w c/o right sided chest pain admitted for r/o ACS 86 y/o F hx HTN, LE edema on lasix p/w c/o right sided chest pain admitted for r/o ACSPatient is an 86 y/o F hx HTN, LE edema on Lasix p/w c/o right sided chest pain x 1 week.     Pt reports it was initially sharp but now pressure like pain, intermittent without radiation to the arm/jaw. States she had no pain on arrival but now feels some pressure like pain behind the right breast/under the axilla that radiates towards the RUQ. Pain not associated with exertion, alleviated by Tylenol. No prior history of similar pain. Admits to lifting heavy groceries about a week ago. No associated diaphoresis, N/V however pt has noticed associated shortness of breath on exertion that preceded chest pain, states onset of SOB and fatigue after walking 2 blocks.  Went to see her cardiologist several weeks ago, who performed an echocardiogram which was reportedly "normal" as per patient.     She was started on Lasix for LE swelling which was since resolved. LE venous duplex US was performed also, presumably no clots, but was started on Simvastatin for unclear reason.  Additional BP agents were recently added on due to uncontrolled blood pressure. Pt reports last treadmill stress test was done earlier in the year and was normal. Denies fevers, chills, cough, calf swelling/tenderness, recent travel. (08 Dec 2020 00:12)    At the time of my evaluation, the patient appeared clinically and hemodynamically stable. No interval events.  CTA chest unremarkable for PE.  NST unremarkable for ischemia.    Patient may be discharged home today with advice to f/u PMD.

## 2020-12-08 NOTE — DISCHARGE NOTE NURSING/CASE MANAGEMENT/SOCIAL WORK - PATIENT PORTAL LINK FT
You can access the FollowMyHealth Patient Portal offered by [facility] by registering at the following website: [email or web address]. By joining [employer]’s FollowMyHealth portal, you will also be able to view your health information using other applications (apps) compatible with our system.

## 2020-12-08 NOTE — H&P ADULT - NSHPSOCIALHISTORY_GEN_ALL_CORE
Former smoker, quit more than 25 years ago. Currently denies tobacco, alcohol or illicit drug use  Ambulates independently   Lives alone

## 2020-12-08 NOTE — DISCHARGE NOTE PROVIDER - CARE PROVIDER_API CALL
ANA MARIA MEDINA  Internal Medicine  [address] [place] [address]  Phone: [phone]  Fax: [phone]  Follow Up Time:

## 2020-12-08 NOTE — DISCHARGE NOTE PROVIDER - NSDCMRMEDTOKEN_GEN_ALL_CORE_FT
Adult Aspirin Regimen 81 mg oral delayed release tablet: 1 tab(s) orally once a day  amLODIPine 10 mg oral tablet: 1 tab(s) orally once a day  hydrALAZINE 50 mg oral tablet: 1 tab(s) orally 2 times a day  Lasix 20 mg oral tablet: 1 tab(s) orally once a day  pravastatin 40 mg oral tablet: 1 tab(s) orally once a day

## 2020-12-08 NOTE — DISCHARGE NOTE PROVIDER - HOSPITAL COURSE
84 y/o F hx HTN, LE edema on lasix p/w c/o right sided chest pain admitted for Mi rueld out TST negative for ischemia. Patient stable for discharge home FU cardiologist and PMD in one week. case dw Dr Saucedo    Chest pain.   Plan: -Pt w/ atypical chest pain, non excretional. EKG non ischemic, Trops flat. Recent TTE that was normal. Last stress about a year ago was also normal  -Low suspicion for ACS  -CTA negative for PE or dissection  -DDx includes MSK, GI etiology, anxiety    TST; Myocardial Perfusion SPECT results are normal. * Review of raw data shows: The study is of good technical quality.* The left ventricle was normal in size. Normal myocardial perfusion scan, with no evidence of infarction or inducible ischemia.* Post-stress gated wall motion analysis was performed (LVEF = 67 %;LVEDV = 64 ml.), revealing normal LV function. RV function appeared normal.      HTN   Plan: -uncontrolled however without signs of end organ damage   -c/w hydralazine, amlodipine  -titrate as needed.     Edema of lower extremity.    Plan: -none at present  -recent dopplers neg for DVT  -reportedly started on lasi    Need for prophylactic measure.    Plan: DVT ppx: lovenox.

## 2020-12-08 NOTE — DISCHARGE NOTE PROVIDER - NSDCACTIVITY_GEN_ALL_CORE
Showering allowed/No heavy lifting/straining/Do not drive or operate machinery/Walking - Indoors allowed/Do not make important decisions

## 2020-12-08 NOTE — H&P ADULT - PROBLEM SELECTOR PLAN 1
-Pt w/ atypical chest pain. EKG non ischemic, Trops flat. Recent TTE that was normal. Last stress about a year ago was also normal  -Low suspicion for ACS  -DDx includes MSK, GI etiology, anxiety  -will order nuclear stress test. Follow up Cardiology recs in AM  -If stress test unremarkable, can also consider RUQ ultrasound although pt without a gallbladder and hepatic function intact -Pt w/ atypical chest pain, non excretional. EKG non ischemic, Trops flat. Recent TTE that was normal. Last stress about a year ago was also normal  -Low suspicion for ACS  -CTA negative for PE or dissection  -DDx includes MSK, GI etiology, anxiety  -will order nuclear stress test. Follow up Cardiology recs in AM  -If stress test unremarkable, can also consider RUQ ultrasound although pt without a gallbladder and hepatic function intact

## 2020-12-10 LAB
SARS-COV-2 IGG SERPL QL IA: NEGATIVE — SIGNIFICANT CHANGE UP
SARS-COV-2 IGM SERPL IA-ACNC: <0.1 INDEX — SIGNIFICANT CHANGE UP